# Patient Record
Sex: FEMALE | Race: WHITE | Employment: OTHER | URBAN - METROPOLITAN AREA
[De-identification: names, ages, dates, MRNs, and addresses within clinical notes are randomized per-mention and may not be internally consistent; named-entity substitution may affect disease eponyms.]

---

## 2019-05-28 ENCOUNTER — HOSPITAL ENCOUNTER (EMERGENCY)
Facility: CLINIC | Age: 74
Discharge: HOME | End: 2019-05-28
Attending: EMERGENCY MEDICINE | Admitting: EMERGENCY MEDICINE
Payer: COMMERCIAL

## 2019-05-28 ENCOUNTER — APPOINTMENT (OUTPATIENT)
Dept: GENERAL RADIOLOGY | Facility: CLINIC | Age: 74
End: 2019-05-28
Attending: EMERGENCY MEDICINE
Payer: COMMERCIAL

## 2019-05-28 ENCOUNTER — APPOINTMENT (OUTPATIENT)
Dept: CT IMAGING | Facility: CLINIC | Age: 74
End: 2019-05-28
Attending: EMERGENCY MEDICINE
Payer: COMMERCIAL

## 2019-05-28 VITALS
WEIGHT: 143 LBS | HEART RATE: 79 BPM | OXYGEN SATURATION: 100 % | BODY MASS INDEX: 23.98 KG/M2 | RESPIRATION RATE: 16 BRPM | TEMPERATURE: 98.3 F | SYSTOLIC BLOOD PRESSURE: 148 MMHG | DIASTOLIC BLOOD PRESSURE: 85 MMHG

## 2019-05-28 DIAGNOSIS — S01.01XA LACERATION OF SCALP WITHOUT FOREIGN BODY, INITIAL ENCOUNTER: ICD-10-CM

## 2019-05-28 DIAGNOSIS — M79.672 LEFT FOOT PAIN: ICD-10-CM

## 2019-05-28 PROCEDURE — 99284 EMERGENCY DEPT VISIT MOD MDM: CPT | Mod: 25 | Performed by: EMERGENCY MEDICINE

## 2019-05-28 PROCEDURE — 73630 X-RAY EXAM OF FOOT: CPT | Mod: LT

## 2019-05-28 PROCEDURE — 12001 RPR S/N/AX/GEN/TRNK 2.5CM/<: CPT | Mod: Z6 | Performed by: EMERGENCY MEDICINE

## 2019-05-28 PROCEDURE — 12001 RPR S/N/AX/GEN/TRNK 2.5CM/<: CPT | Performed by: EMERGENCY MEDICINE

## 2019-05-28 PROCEDURE — 25000125 ZZHC RX 250: Performed by: EMERGENCY MEDICINE

## 2019-05-28 PROCEDURE — 70450 CT HEAD/BRAIN W/O DYE: CPT

## 2019-05-28 RX ORDER — LANOLIN ALCOHOL/MO/W.PET/CERES
1 CREAM (GRAM) TOPICAL
COMMUNITY
Start: 2016-12-13

## 2019-05-28 RX ORDER — CHOLECALCIFEROL (VITAMIN D3) 50 MCG
2000 TABLET ORAL
COMMUNITY
Start: 2014-09-03

## 2019-05-28 RX ORDER — LIDOCAINE/PRILOCAINE 2.5 %-2.5%
1 CREAM (GRAM) TOPICAL
Status: DISCONTINUED | OUTPATIENT
Start: 2019-05-28 | End: 2019-05-28 | Stop reason: HOSPADM

## 2019-05-28 RX ORDER — LIDOCAINE HYDROCHLORIDE AND EPINEPHRINE 10; 10 MG/ML; UG/ML
1 INJECTION, SOLUTION INFILTRATION; PERINEURAL ONCE
Status: COMPLETED | OUTPATIENT
Start: 2019-05-28 | End: 2019-05-28

## 2019-05-28 RX ADMIN — LIDOCAINE HYDROCHLORIDE,EPINEPHRINE BITARTRATE 1 ML: 10; .01 INJECTION, SOLUTION INFILTRATION; PERINEURAL at 17:03

## 2019-05-28 ASSESSMENT — ENCOUNTER SYMPTOMS
DIZZINESS: 0
WOUND: 1
LIGHT-HEADEDNESS: 0
SHORTNESS OF BREATH: 0

## 2019-05-28 NOTE — ED PROVIDER NOTES
History     Chief Complaint   Patient presents with     Fall     REPORTS carrying too many items and missed a step going downstairs and fell to the right, hit back of head on a board/shelf--bleeding controlled, denies loss of consciousness and not on blood thinners; right knee and left ankle tender and sore     HPI  Maegan Fernandes is a 73 year old female with a history of osteoporosis, colon cancer status post hemicolectomy and chemotherapy (), who presents to the Emergency Department for evaluation following mechanical fall earlier today.     Patient reports that she was carrying a heavy load while walking down steps at which time she misstep, and in an attempt to avoid completely falling down the stairs, patient inadvertently struck the back of her head against a bookshelf and subsequently sustained a laceration. Her bleeding is currently controlled and patient denies any loss of consciousness following the incident. She denies any chest pain, shortness of breath, light-headedness prior to when she lost balance. Patient currently complains of some right knee and left ankle pain sustained while she was avoiding the fall. She is able to bear weight and ambulate. She is not on anticoagulants.  Denies headache or other neurologic complaints.      I have reviewed the Medications, Allergies, Past Medical and Surgical History, and Social History in the [email or web address] system.    Past Medical History:   Diagnosis Date     Colon cancer (H)     status post resection and chemotherapy      Osteoporosis      Sensorineural hearing loss     mild to moderate bilateral       Past Surgical History:   Procedure Laterality Date     HEMICOLECTOMY, RT/LT      right hemicolectomy       Family History   Problem Relation Age of Onset     C.A.D. Maternal Grandfather      Hypertension Mother      Cancer - colorectal Mother          age 59     Cerebrovascular Disease Father      Respiratory Father          COPD     Diabetes Paternal  Grandfather        Social History     Tobacco Use     Smoking status: Never Smoker     Smokeless tobacco: Never Used   Substance Use Topics     Alcohol use: Yes     Alcohol/week: 1.5 oz     Types: 3 Standard drinks or equivalent per week     Comment: occasional wine drinker     No current facility-administered medications for this encounter.      Current Outpatient Medications   Medication     CALCIUM CITRATE PO     cyanocobalamin (VITAMIN B-12) 1000 MCG tablet     denosumab (PROLIA) 60 MG/ML SOSY injection     vitamin D3 (CHOLECALCIFEROL) 2000 units (50 mcg) tablet      No Known Allergies    Review of Systems   Respiratory: Negative for shortness of breath.    Cardiovascular: Negative for chest pain.   Musculoskeletal:        Positive for left ankle and right knee pain.   Skin: Positive for wound (posterior scalp).   Neurological: Negative for dizziness, syncope and light-headedness.   All other systems reviewed and are negative.      Physical Exam   BP: (!) 144/103  Pulse: 79  Temp: 98.3  F (36.8  C)  Resp: 16  Weight: 64.9 kg (143 lb)  SpO2: 97 %      Physical Exam    General: awake, alert, NAD  Head: normal cephalic, patient has a 2 cm laceration noted over her posterior occiput.  Bleeding is currently controlled, no surrounding crepitus or hematoma  HEENT: pupils equal, conjugate gaze in tact  Neck: Supple  CV: regular rate and rhythm without murmur  Lungs: clear to ascultation  Abd: soft, non-tender, no guarding, no peritoneal signs  EXT: lower extremities without swelling or edema.  Patient has point tenderness over the base of the fifth metatarsal, no significant swelling, ecchymosis, or deformity.  Right knee without swelling, deformity, ecchymosis, no tenderness palpation over the patella.  Normal flexion-extension of the extremity.  Neuro: awake, answers questions appropriately. No focal deficits noted       ED Course        Procedures       [facility] Procedure Note        Laceration Repair:     Performed by: Brett Salazar  Authorized by: Brett Salazar  Consent given by: Patient who states understanding of the procedure being performed after discussing the risks, benefits and alternatives.    Preparation: Patient was prepped and draped in usual sterile fashion.  Irrigation solution: saline    Body area:scalp  Laceration length: 2cm  Contamination: The wound is not contaminated.  Foreign bodies:none  Tendon involvement: none  Anesthesia: Local  Local anesthetic: Lidocaine     1%, with epinephrine  Anesthetic total: 4ml    Debridement: none  Skin closure: Closed with 5 Staples  Technique: interrupted  Approximation: close  Approximation difficulty: simple    Patient tolerance: Patient tolerated the procedure well with no immediate complications.      Assessments & Plan (with Medical Decision Making)   Maegan is a 73-year-old female who presents with injury after a mechanical fall.  She is quite clear that this is mechanical denies any prodromal symptoms such as chest pain, shortness of breath, dizziness or lightheadedness and denies any loss of consciousness so I do not think we need to proceed with any further evaluation for syncope.    She has a laceration to the posterior occiput, will obtain head CT though patient currently has a normal neurologic exam is not on a blood thinner so low suspicion for acute intracranial pathology.  She is also complaining of some point tenderness on her lateral foot will obtain x-ray.  Patient was complaining of some knee pain however has no bony tenderness, normal flexion-extension and no obvious deformity and is able to bear weight so I do not think x-ray is necessary at this time.    Foot x-ray negative for acute fracture.  Head CT negative for acute pathology.    The laceration was closed with staples.  Tetanus not indicated today.  Patient was given wound care instructions.  She will stay with a friend tonight.  She developed no concerning signs or symptoms while she  was in the emergency department.  We will follow-up in 5 days for staple removal.    I have reviewed the nursing notes.    I have reviewed the findings, diagnosis, plan and need for follow up with the patient.       Medication List      There are no discharge medications for this visit.         Final diagnoses:   Laceration of scalp without foreign body, initial encounter   Left foot pain     I, Mecca Manjarrez, am serving as a trained medical scribe to document services personally performed by Brett Salazar MD, based on the provider's statements to me.   I, Brett Salazar MD, was physically present and have reviewed and verified the accuracy of this note documented by Mecca Manjarrez.    5/28/2019   [facility], [place], EMERGENCY DEPARTMENT     Brett Salazar MD  05/28/19 3949

## 2019-05-28 NOTE — DISCHARGE INSTRUCTIONS
TODAY'S VISIT:  You were seen today for a fall.  Your head CT was negative.  Your laceration was closed with staples.  Your x-ray of your foot was negative for acute fracture.  -     FOLLOW-UP:  Please make an appointment to follow up with:  -Can return to the ED for staple removal in 5 days    PRESCRIPTIONS / MEDICATIONS:  -Okay to take Tylenol or ibuprofen as needed for headache or foot discomfort    OTHER INSTRUCTIONS:  -Okay to wash gently with soap and water in the next 24 hours, do not scrub or pick at the staples    RETURN TO THE EMERGENCY DEPARTMENT  Return to the Emergency Department at any time for new/worsening symptoms.

## 2019-05-28 NOTE — ED AVS SNAPSHOT
[facility], Emergency Department  [address]  [facility] [address]  Phone:  [phone]  Fax:  [phone]                                    Maegan Fernandes   MRN: [id number]    Department:  [facility], Emergency Department   Date of Visit:  5/28/2019           After Visit Summary Signature Page    I have received my discharge instructions, and my questions have been answered. I have discussed any challenges I see with this plan with the nurse or doctor.    ..........................................................................................................................................  Patient/Patient Representative Signature      ..........................................................................................................................................  Patient Representative Print Name and Relationship to Patient    ..................................................               ................................................  Date                                   Time    ..........................................................................................................................................  Reviewed by Signature/Title    ...................................................              ..............................................  Date                                               Time          22EPIC Rev 08/18

## 2019-06-02 ENCOUNTER — HOSPITAL ENCOUNTER (EMERGENCY)
Facility: CLINIC | Age: 74
Discharge: HOME | End: 2019-06-02
Attending: EMERGENCY MEDICINE | Admitting: EMERGENCY MEDICINE
Payer: COMMERCIAL

## 2019-06-02 VITALS
RESPIRATION RATE: 16 BRPM | WEIGHT: 144.5 LBS | TEMPERATURE: 97.3 F | DIASTOLIC BLOOD PRESSURE: 82 MMHG | SYSTOLIC BLOOD PRESSURE: 127 MMHG | BODY MASS INDEX: 24.23 KG/M2 | OXYGEN SATURATION: 99 %

## 2019-06-02 DIAGNOSIS — Z48.02 ENCOUNTER FOR STAPLE REMOVAL: ICD-10-CM

## 2019-06-02 PROCEDURE — 99281 EMR DPT VST MAYX REQ PHY/QHP: CPT | Mod: Z6 | Performed by: EMERGENCY MEDICINE

## 2019-06-02 PROCEDURE — 99281 EMR DPT VST MAYX REQ PHY/QHP: CPT | Performed by: EMERGENCY MEDICINE

## 2019-06-02 ASSESSMENT — ENCOUNTER SYMPTOMS
CONFUSION: 0
ARTHRALGIAS: 0
EYE REDNESS: 0
SHORTNESS OF BREATH: 0
NECK STIFFNESS: 0
DIFFICULTY URINATING: 0
COLOR CHANGE: 0
ABDOMINAL PAIN: 0
FEVER: 0
HEADACHES: 0

## 2019-06-02 NOTE — ED AVS SNAPSHOT
[facility], [place], Emergency Department  [address]  [facility] [address]  Phone:  [phone]  Fax:  [phone]                                    Maegan Fernandes   MRN: [id number]    Department:  [facility], Emergency Department   Date of Visit:  6/2/2019           After Visit Summary Signature Page    I have received my discharge instructions, and my questions have been answered. I have discussed any challenges I see with this plan with the nurse or doctor.    ..........................................................................................................................................  Patient/Patient Representative Signature      ..........................................................................................................................................  Patient Representative Print Name and Relationship to Patient    ..................................................               ................................................  Date                                   Time    ..........................................................................................................................................  Reviewed by Signature/Title    ...................................................              ..............................................  Date                                               Time          22EPIC Rev 08/18

## 2019-06-02 NOTE — ED PROVIDER NOTES
History     Chief Complaint   Patient presents with     Suture Removal     Pt has staples to (L) side of head. They need to be removed     HPI  Maegan Fernandes is a 73 year old female who is here for suture removal.  Patient had uncomplicated scalp laceration 5 days ago and was told to present today for staple removal.  She denies any type of drainage or discharge, redness or swelling, or other ongoing issues.    I have reviewed the Medications, Allergies, Past Medical and Surgical History, and Social History in the [employer] system.    Review of Systems   Constitutional: Negative for fever.   HENT: Negative for congestion.    Eyes: Negative for redness.   Respiratory: Negative for shortness of breath.    Cardiovascular: Negative for chest pain.   Gastrointestinal: Negative for abdominal pain.   Genitourinary: Negative for difficulty urinating.   Musculoskeletal: Negative for arthralgias and neck stiffness.   Skin: Negative for color change.   Neurological: Negative for headaches.   Psychiatric/Behavioral: Negative for confusion.       Physical Exam   BP: 133/75  Heart Rate: 83  Temp: 97.3  F (36.3  C)  Resp: 16  Weight: 65.5 kg (144 lb 8 oz)  SpO2: 97 %      Physical Exam   HENT:   Head:           ED Course        Procedures             Labs Ordered and Resulted from Time of ED Arrival Up to the Time of Departure from the ED - No data to display  5 staples were removed without difficulty       Assessments & Plan (with Medical Decision Making)   73-year-old female presents requesting staples be removed from scalp that were placed 5 days ago.  Wound appears to be healing without difficulty.  5 staples were removed and patient will be discharged.    I have reviewed the nursing notes.    I have reviewed the findings, diagnosis, plan and need for follow up with the patient.       Medication List      There are no discharge medications for this visit.         Final diagnoses:   Encounter for staple removal       6/2/2019    [facility], [place], EMERGENCY DEPARTMENT     Joe Garvin MD  06/02/19 [id number]

## 2021-05-26 ENCOUNTER — RECORDS - HEALTHEAST (OUTPATIENT)
Dept: ADMINISTRATIVE | Facility: CLINIC | Age: 76
End: 2021-05-26